# Patient Record
Sex: FEMALE | URBAN - METROPOLITAN AREA
[De-identification: names, ages, dates, MRNs, and addresses within clinical notes are randomized per-mention and may not be internally consistent; named-entity substitution may affect disease eponyms.]

---

## 2021-08-11 PROBLEM — Z00.00 ENCOUNTER FOR PREVENTIVE HEALTH EXAMINATION: Status: ACTIVE | Noted: 2021-08-11

## 2021-09-27 ENCOUNTER — NEW PATIENT (OUTPATIENT)
Dept: URBAN - METROPOLITAN AREA CLINIC 37 | Facility: CLINIC | Age: 66
End: 2021-09-27

## 2021-09-27 DIAGNOSIS — H02.886: ICD-10-CM

## 2021-09-27 DIAGNOSIS — H25.9: ICD-10-CM

## 2021-09-27 DIAGNOSIS — H02.883: ICD-10-CM

## 2021-09-27 PROCEDURE — 92004 COMPRE OPH EXAM NEW PT 1/>: CPT

## 2021-09-27 ASSESSMENT — VISUAL ACUITY
OU_SC: 20/100
OD_SC: 20/200
OS_PH: 20/80-1
OS_SC: 20/100-1
OD_PH: 20/100

## 2021-09-28 ENCOUNTER — APPOINTMENT (OUTPATIENT)
Dept: NEUROSURGERY | Facility: CLINIC | Age: 66
End: 2021-09-28
Payer: MEDICARE

## 2021-09-28 VITALS
HEIGHT: 66 IN | HEART RATE: 60 BPM | SYSTOLIC BLOOD PRESSURE: 137 MMHG | BODY MASS INDEX: 21.86 KG/M2 | DIASTOLIC BLOOD PRESSURE: 77 MMHG | OXYGEN SATURATION: 99 % | TEMPERATURE: 98 F | WEIGHT: 136 LBS

## 2021-09-28 DIAGNOSIS — Z86.79 PERSONAL HISTORY OF OTHER DISEASES OF THE CIRCULATORY SYSTEM: ICD-10-CM

## 2021-09-28 DIAGNOSIS — Z85.850 PERSONAL HISTORY OF MALIGNANT NEOPLASM OF THYROID: ICD-10-CM

## 2021-09-28 DIAGNOSIS — I10 ESSENTIAL (PRIMARY) HYPERTENSION: ICD-10-CM

## 2021-09-28 DIAGNOSIS — U07.1 COVID-19: ICD-10-CM

## 2021-09-28 PROCEDURE — 99203 OFFICE O/P NEW LOW 30 MIN: CPT

## 2021-09-28 RX ORDER — LEVOTHYROXINE SODIUM 137 UG/1
TABLET ORAL
Refills: 0 | Status: ACTIVE | COMMUNITY

## 2021-09-28 RX ORDER — PANTOPRAZOLE SODIUM 20 MG/1
TABLET, DELAYED RELEASE ORAL
Refills: 0 | Status: ACTIVE | COMMUNITY

## 2021-09-28 RX ORDER — ALPRAZOLAM 2 MG/1
TABLET ORAL
Refills: 0 | Status: ACTIVE | COMMUNITY

## 2021-09-28 RX ORDER — METOPROLOL SUCCINATE 100 MG/1
TABLET, EXTENDED RELEASE ORAL
Refills: 0 | Status: ACTIVE | COMMUNITY

## 2021-09-28 RX ORDER — DICYCLOMINE HYDROCHLORIDE 10 MG/1
CAPSULE ORAL
Refills: 0 | Status: ACTIVE | COMMUNITY

## 2021-09-28 RX ORDER — ACETAZOLAMIDE 250 MG
TABLET ORAL
Refills: 0 | Status: ACTIVE | COMMUNITY

## 2021-09-28 RX ORDER — LOSARTAN POTASSIUM 100 MG/1
TABLET, FILM COATED ORAL
Refills: 0 | Status: ACTIVE | COMMUNITY

## 2021-10-04 NOTE — ASSESSMENT
[FreeTextEntry1] : Impression:\par History of CSF Leaks with most recent successful blood patch in 2018\par Worsening high pressure symptoms requiring increasing Diamox dosing over the past 9 months\par No papilledema on 8/19/21 eye exam\par Left Ear Pulsatile Tinnitus\par No recent imaging or lumbar puncture\par \par \par Plan:\par MRI Head w/wo Contrast\par MRV Head w/wo Contrast to r/o venous sinus stenosis\par Follow Up with Me After Imaging

## 2021-10-04 NOTE — HISTORY OF PRESENT ILLNESS
[de-identified] : Ms. SPRINGER is a pleasant 66 year old female who presents today with a chief complaint of idiopathic intracranial hypertension.  In 1997, she was having severe headaches while upright.  She underwent a blind blood patch which resolves her symptoms for many years.  In 2006, she started having new intermittent symptoms including left ear fullness, tinnitus, vertigo, tingling in head and extremities.  She went to many MDs with all different specialties but could never find a diagnosis.  Again she underwent blind blood patch but this time it did not improve symptoms.\par \par In 10/2018, she consulted with Dr. Thomas at [facility].  She was found to have a CSF leak at T2-3 and underwent targeted fibrin and blood patch.  Her low pressure symptoms resolved and she was discharged on Diamox due to the likelihood of rebounding high pressure.  She states that from [id number] she was feeling well and able to wean her Diamox down to about 125mg a day (from a max of 500mg daily). \par \par In 1/2021, she started having headaches and needing to go back up on her Diamox.\par \par Currently:\par \par Meds - Diamox 500mg Daily, Which she is tolerating well.\par Pulsatile Tinnitus - Left ear, constant, very bothersome, left ear fullness\par Headaches - positional, worse with laying flat and in the morning, frequent, cannot sleep flat\par Vision - No blurry vision, diplopia or TVOs; No papilledema 8/2021\par Other - Fatigue, Anxiety, "Spacey feeling/ Off Balance"\par \par She has not had an LP.\par

## 2021-10-04 NOTE — REVIEW OF SYSTEMS
[Feeling Poorly] : feeling poorly [Dizziness] : dizziness [Anxiety] : anxiety [Depression] : depression [As Noted in HPI] : as noted in HPI [Negative] : Heme/Lymph [de-identified] : Headaches

## 2021-10-14 ENCOUNTER — TRANSCRIPTION ENCOUNTER (OUTPATIENT)
Age: 66
End: 2021-10-14

## 2021-10-15 ENCOUNTER — DIAGNOSTICS ONLY (OUTPATIENT)
Dept: URBAN - METROPOLITAN AREA CLINIC 37 | Facility: CLINIC | Age: 66
End: 2021-10-15

## 2021-10-15 DIAGNOSIS — H25.9: ICD-10-CM

## 2021-10-15 PROCEDURE — 92136 OPHTHALMIC BIOMETRY: CPT

## 2021-10-15 ASSESSMENT — KERATOMETRY
OS_AXISANGLE2_DEGREES: 177
OD_AXISANGLE2_DEGREES: 176
OD_K1POWER_DIOPTERS: 41.75
OS_K2POWER_DIOPTERS: 42.75
OS_K1POWER_DIOPTERS: 42.25
OD_AXISANGLE_DEGREES: 86
OD_K2POWER_DIOPTERS: 42.75
OS_AXISANGLE_DEGREES: 87

## 2021-10-26 ENCOUNTER — APPOINTMENT (OUTPATIENT)
Dept: NEUROSURGERY | Facility: CLINIC | Age: 66
End: 2021-10-26
Payer: MEDICARE

## 2021-10-26 DIAGNOSIS — G93.2 BENIGN INTRACRANIAL HYPERTENSION: ICD-10-CM

## 2021-10-26 PROCEDURE — 99213 OFFICE O/P EST LOW 20 MIN: CPT | Mod: 95

## 2021-10-26 NOTE — PHYSICAL EXAM
[General Appearance - Alert] : alert [General Appearance - In No Acute Distress] : in no acute distress [General Appearance - Well-Appearing] : healthy appearing [] : normal voice and communication [Oriented To Time, Place, And Person] : oriented to person, place, and time [Person] : oriented to person [Place] : oriented to place [Time] : oriented to time [Abnormal Walk] : normal gait

## 2021-11-01 NOTE — ASSESSMENT
[FreeTextEntry1] : Impression:\par History of CSF Leaks with most recent successful blood patch in 2018\par Worsening high pressure symptoms requiring increasing Diamox dosing over the past 9 months\par No papilledema on 8/19/21 eye exam\par Left Ear Pulsatile Tinnitus\par No recent lumbar puncture\par \par MRI/MRV reveals no venous sinus stenosis, unremarkable\par \par \par Plan:\par No indication for venous sinus stenting for IIH\par Continue Medical Management with Diamox\par Follow Up with Me As Needed\par \par

## 2021-11-01 NOTE — REVIEW OF SYSTEMS
[Feeling Poorly] : feeling poorly [Dizziness] : dizziness [Anxiety] : anxiety [Depression] : depression [As Noted in HPI] : as noted in HPI [Negative] : Heme/Lymph [de-identified] : Headaches

## 2021-11-01 NOTE — HISTORY OF PRESENT ILLNESS
[de-identified] : Ms. SPRINGER is a pleasant 66 year old female who presents today for follow up of idiopathic intracranial hypertension and MRI/MRA/MRV review.  In 1997, she was having severe headaches while upright.  She underwent a blind blood patch which resolves her symptoms for many years.  In 2006, she started having new intermittent symptoms including left ear fullness, tinnitus, vertigo, tingling in head and extremities.  She went to many MDs with all different specialties but could never find a diagnosis.  Again she underwent blind blood patch but this time it did not improve symptoms.\par \par In 10/2018, she consulted with Dr. Thomas at [facility] [facility].  She was found to have a CSF leak at T2-3 and underwent targeted fibrin and blood patch.  Her low pressure symptoms resolved and she was discharged on Diamox due to the likelihood of rebounding high pressure.  She states that from [id number] she was feeling well and able to wean her Diamox down to about 125mg a day (from a max of 500mg daily). \par \par In 1/2021, she started having headaches and needing to go back up on her Diamox.\par \par Currently:\par \par Meds - Diamox 500mg Daily, Which she is tolerating well.\par Pulsatile Tinnitus - Left ear, constant, very bothersome, left ear fullness\par Headaches - positional, worse with laying flat and in the morning, frequent, cannot sleep flat\par Vision - No blurry vision, diplopia or TVOs; No papilledema 8/2021\par Other - Fatigue, Anxiety, "Spacey feeling/ Off Balance"\par \par She has not had an LP.\par

## 2021-12-06 ENCOUNTER — SURGERY/PROCEDURE (OUTPATIENT)
Dept: SURGICAL CENTER 2 | Facility: SURGICAL CENTER | Age: 66
End: 2021-12-06

## 2021-12-06 DIAGNOSIS — H25.89: ICD-10-CM

## 2021-12-06 DIAGNOSIS — H25.11: ICD-10-CM

## 2021-12-06 PROCEDURE — 66982 XCAPSL CTRC RMVL CPLX WO ECP: CPT

## 2021-12-07 ASSESSMENT — KERATOMETRY
OD_K2POWER_DIOPTERS: 42.75
OS_K1POWER_DIOPTERS: 42.25
OS_AXISANGLE_DEGREES: 87
OD_K1POWER_DIOPTERS: 41.75
OS_K2POWER_DIOPTERS: 42.75
OD_AXISANGLE_DEGREES: 86
OD_AXISANGLE2_DEGREES: 176
OS_AXISANGLE2_DEGREES: 177

## 2021-12-08 ENCOUNTER — 1 DAY POST-OP (OUTPATIENT)
Dept: URBAN - METROPOLITAN AREA CLINIC 37 | Facility: CLINIC | Age: 66
End: 2021-12-08

## 2021-12-08 DIAGNOSIS — Z96.1: ICD-10-CM

## 2021-12-08 DIAGNOSIS — H25.12: ICD-10-CM

## 2021-12-08 PROCEDURE — 99024 POSTOP FOLLOW-UP VISIT: CPT

## 2021-12-08 ASSESSMENT — KERATOMETRY
OD_AXISANGLE_DEGREES: 7
OD_K2POWER_DIOPTERS: 43.25
OD_K1POWER_DIOPTERS: 42.50
OD_AXISANGLE2_DEGREES: 97

## 2021-12-08 ASSESSMENT — VISUAL ACUITY
OD_SC: 20/40
OD_SC: J10
OD_PH: 20/30-1

## 2021-12-13 ENCOUNTER — DIAGNOSTICS ONLY (OUTPATIENT)
Dept: URBAN - METROPOLITAN AREA CLINIC 37 | Facility: CLINIC | Age: 66
End: 2021-12-13

## 2021-12-13 ENCOUNTER — SURGERY/PROCEDURE (OUTPATIENT)
Dept: SURGICAL CENTER 2 | Facility: SURGICAL CENTER | Age: 66
End: 2021-12-13

## 2021-12-13 DIAGNOSIS — H25.12: ICD-10-CM

## 2021-12-13 DIAGNOSIS — H25.89: ICD-10-CM

## 2021-12-13 DIAGNOSIS — H25.812: ICD-10-CM

## 2021-12-13 PROCEDURE — 92136 OPHTHALMIC BIOMETRY: CPT | Mod: 26,LT

## 2021-12-13 PROCEDURE — 66982 XCAPSL CTRC RMVL CPLX WO ECP: CPT | Mod: 79,LT

## 2021-12-14 ENCOUNTER — 1 DAY POST-OP (OUTPATIENT)
Dept: URBAN - METROPOLITAN AREA CLINIC 47 | Facility: CLINIC | Age: 66
End: 2021-12-14

## 2021-12-14 DIAGNOSIS — Z98.890: ICD-10-CM

## 2021-12-14 PROCEDURE — 99024 POSTOP FOLLOW-UP VISIT: CPT

## 2021-12-14 ASSESSMENT — KERATOMETRY
OS_AXISANGLE2_DEGREES: 130
OD_AXISANGLE2_DEGREES: 162
OS_K1POWER_DIOPTERS: 42.25
OD_AXISANGLE_DEGREES: 7
OD_K1POWER_DIOPTERS: 42.50
OS_K2POWER_DIOPTERS: 42.75
OD_AXISANGLE2_DEGREES: 97
OD_AXISANGLE_DEGREES: 7
OD_K2POWER_DIOPTERS: 42.75
OD_AXISANGLE2_DEGREES: 97
OD_K2POWER_DIOPTERS: 43.25
OD_K1POWER_DIOPTERS: 42.50
OD_K1POWER_DIOPTERS: 41.75
OS_AXISANGLE_DEGREES: 40
OD_K2POWER_DIOPTERS: 43.25
OD_AXISANGLE_DEGREES: 72

## 2021-12-14 ASSESSMENT — TONOMETRY
OD_IOP_MMHG: 13
OS_IOP_MMHG: 13

## 2021-12-14 ASSESSMENT — VISUAL ACUITY
OD_SC: 20/30
OS_SC: 20/25-2

## 2022-01-13 ENCOUNTER — 1 MO CAT PO (OUTPATIENT)
Dept: URBAN - METROPOLITAN AREA CLINIC 37 | Facility: CLINIC | Age: 67
End: 2022-01-13

## 2022-01-13 DIAGNOSIS — Z96.1: ICD-10-CM

## 2022-01-13 PROCEDURE — 99024 POSTOP FOLLOW-UP VISIT: CPT

## 2022-01-13 ASSESSMENT — VISUAL ACUITY
OS_PH: 20/30
OD_SC: 20/40
OU_SC: 20/40
OD_PH: 20/25
OS_SC: 20/60+1

## 2022-01-13 ASSESSMENT — KERATOMETRY
OS_K1POWER_DIOPTERS: 42.25
OD_AXISANGLE2_DEGREES: 148
OD_AXISANGLE_DEGREES: 58
OS_AXISANGLE2_DEGREES: 110
OS_K2POWER_DIOPTERS: 43.25
OD_K2POWER_DIOPTERS: 43.00
OD_K1POWER_DIOPTERS: 42.50
OS_AXISANGLE_DEGREES: 20

## 2022-01-13 ASSESSMENT — TONOMETRY
OD_IOP_MMHG: 14
OS_IOP_MMHG: 11